# Patient Record
Sex: FEMALE | Race: WHITE | NOT HISPANIC OR LATINO | URBAN - METROPOLITAN AREA
[De-identification: names, ages, dates, MRNs, and addresses within clinical notes are randomized per-mention and may not be internally consistent; named-entity substitution may affect disease eponyms.]

---

## 2019-08-13 ENCOUNTER — OFFICE VISIT (OUTPATIENT)
Dept: URBAN - METROPOLITAN AREA CLINIC 48 | Facility: CLINIC | Age: 84
End: 2019-08-13
Payer: MEDICARE

## 2019-08-13 DIAGNOSIS — H25.813 COMBINED FORMS OF AGE-RELATED CATARACT, BILATERAL: Primary | ICD-10-CM

## 2019-08-13 PROCEDURE — 92004 COMPRE OPH EXAM NEW PT 1/>: CPT | Performed by: OPHTHALMOLOGY

## 2019-08-13 ASSESSMENT — VISUAL ACUITY: OS: 20/25

## 2019-08-13 ASSESSMENT — KERATOMETRY
OD: 46.25
OS: 46.38

## 2019-08-13 ASSESSMENT — INTRAOCULAR PRESSURE
OD: 16
OS: 16

## 2019-08-13 NOTE — IMPRESSION/PLAN
Impression: Combined forms of age-related cataract, bilateral: H25.813. Plan: The patient has a visually significant cataract in both eyes. After discussion with the patient and careful examination it has been determined that a cataract in both eyes is accounting for a significant amount of the patient's visual symptoms. Cataract surgery and the associated risks, benefits, alternatives, expectations, and recovery were discussed in detail with the patient. All questions were answered. The patient understands that there may be some limitation in visual potential given any pre-existing ocular disease. Discussed option of eye drops with patient, patient elects, ofloxacin, prednisolone and ketorolac, discussed possible side effects of drops. The patient desires cataract surgery in both eyes. Schedule cataract surgery in both eyes; right  eye, then left  eye. ( 3 weeks apart) RL 2 Please educate patient for standard, toric and multifocal lenses

## 2019-08-27 ENCOUNTER — TESTING ONLY (OUTPATIENT)
Dept: URBAN - METROPOLITAN AREA CLINIC 48 | Facility: CLINIC | Age: 84
End: 2019-08-27
Payer: MEDICARE

## 2019-08-27 PROCEDURE — 92136 OPHTHALMIC BIOMETRY: CPT | Performed by: OPHTHALMOLOGY

## 2019-08-27 ASSESSMENT — PACHYMETRY
OS: 22.44
OD: 22.82
OD: 2.60
OS: 2.48

## 2019-09-17 ENCOUNTER — SURGERY (OUTPATIENT)
Dept: URBAN - METROPOLITAN AREA SURGERY 26 | Facility: SURGERY | Age: 84
End: 2019-09-17
Payer: MEDICARE

## 2019-09-17 PROCEDURE — 66984 XCAPSL CTRC RMVL W/O ECP: CPT | Performed by: OPHTHALMOLOGY

## 2019-09-18 ENCOUNTER — POST-OPERATIVE VISIT (OUTPATIENT)
Dept: URBAN - METROPOLITAN AREA CLINIC 48 | Facility: CLINIC | Age: 84
End: 2019-09-18

## 2019-09-18 PROCEDURE — 99024 POSTOP FOLLOW-UP VISIT: CPT | Performed by: OPHTHALMOLOGY

## 2019-09-18 RX ORDER — BRIMONIDINE TARTRATE 2 MG/ML
0.2 % SOLUTION/ DROPS OPHTHALMIC
Qty: 5 | Refills: 1 | Status: ACTIVE
Start: 2019-09-18

## 2019-09-18 ASSESSMENT — INTRAOCULAR PRESSURE: OD: 40

## 2019-09-20 ENCOUNTER — POST-OPERATIVE VISIT (OUTPATIENT)
Dept: URBAN - METROPOLITAN AREA CLINIC 48 | Facility: CLINIC | Age: 84
End: 2019-09-20
Payer: MEDICARE

## 2019-09-20 PROCEDURE — 99024 POSTOP FOLLOW-UP VISIT: CPT | Performed by: OPTOMETRIST

## 2019-09-20 ASSESSMENT — INTRAOCULAR PRESSURE: OD: 14

## 2019-09-25 ENCOUNTER — POST-OPERATIVE VISIT (OUTPATIENT)
Dept: URBAN - METROPOLITAN AREA CLINIC 48 | Facility: CLINIC | Age: 84
End: 2019-09-25

## 2019-09-25 PROCEDURE — 99024 POSTOP FOLLOW-UP VISIT: CPT | Performed by: OPHTHALMOLOGY

## 2019-09-25 ASSESSMENT — INTRAOCULAR PRESSURE: OD: 11

## 2019-09-25 ASSESSMENT — VISUAL ACUITY: OD: 20/25-2

## 2019-10-10 ENCOUNTER — POST-OPERATIVE VISIT (OUTPATIENT)
Dept: URBAN - METROPOLITAN AREA CLINIC 48 | Facility: CLINIC | Age: 84
End: 2019-10-10

## 2019-10-10 DIAGNOSIS — Z09 ENCNTR FOR F/U EXAM AFT TRTMT FOR COND OTH THAN MALIG NEOPLM: Primary | ICD-10-CM

## 2019-10-10 PROCEDURE — 99024 POSTOP FOLLOW-UP VISIT: CPT | Performed by: OPHTHALMOLOGY

## 2019-10-10 ASSESSMENT — INTRAOCULAR PRESSURE
OD: 12
OS: 14

## 2023-06-02 ENCOUNTER — OFFICE VISIT (OUTPATIENT)
Dept: URBAN - METROPOLITAN AREA CLINIC 48 | Facility: CLINIC | Age: 88
End: 2023-06-02
Payer: MEDICARE

## 2023-06-02 DIAGNOSIS — H25.812 COMBINED FORMS OF AGE-RELATED CATARACT, LEFT EYE: Primary | ICD-10-CM

## 2023-06-02 DIAGNOSIS — H02.401 PTOSIS OF RIGHT EYELID: ICD-10-CM

## 2023-06-02 PROCEDURE — 99204 OFFICE O/P NEW MOD 45 MIN: CPT | Performed by: OPHTHALMOLOGY

## 2023-06-02 ASSESSMENT — VISUAL ACUITY
OS: 20/40
OD: 20/40

## 2023-06-02 ASSESSMENT — INTRAOCULAR PRESSURE
OS: 14
OD: 14

## 2023-06-02 NOTE — IMPRESSION/PLAN
Impression: Combined forms of age-related cataract, left eye: H25.812. Plan: The patient has a visually significant cataract in the left eye. After discussion with the patient and careful examination it has been determined that a cataract in the left eye is accounting for a significant amount of the patient's visual symptoms. Cataract surgery and the associated risks, benefits, alternatives, expectations, and recovery were discussed in detail with the patient. All questions were answered. The patient understands that there may be some limitation in visual potential given any pre-existing ocular disease. Discussed option of eye drops with patient, patient elects Prednisolone, Ketorolac and Ofloxacin drops   , discussed possible side effects of drops. The patient desires cataract surgery in the left eye. The patient desires cataract surgery in the left eye. Marquez Mcfadden pending PA Schedule cataract surgery in the left eye. RL 3
possible complex care requiring Trypan blue Patient candidate for  Premium lens , patient not interested, standard lend distance target Surgeon:  Dr. Madison Casillas Please schedule A-scan

## 2023-06-02 NOTE — IMPRESSION/PLAN
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 Plan: Will reevaluate  droopy eyelid after Left eye cat surgery.  


RTC 4 months follow up